# Patient Record
Sex: MALE | Race: BLACK OR AFRICAN AMERICAN | Employment: UNEMPLOYED | URBAN - METROPOLITAN AREA
[De-identification: names, ages, dates, MRNs, and addresses within clinical notes are randomized per-mention and may not be internally consistent; named-entity substitution may affect disease eponyms.]

---

## 2023-05-01 ENCOUNTER — HOSPITAL ENCOUNTER (EMERGENCY)
Age: 48
Discharge: HOME | End: 2023-05-01
Attending: EMERGENCY MEDICINE
Payer: COMMERCIAL

## 2023-05-01 VITALS
DIASTOLIC BLOOD PRESSURE: 90 MMHG | TEMPERATURE: 98.4 F | HEART RATE: 60 BPM | WEIGHT: 220.9 LBS | RESPIRATION RATE: 15 BRPM | OXYGEN SATURATION: 97 % | SYSTOLIC BLOOD PRESSURE: 147 MMHG

## 2023-05-01 DIAGNOSIS — R82.90 URINE ABNORMALITY: Primary | ICD-10-CM

## 2023-05-01 LAB
BILIRUB UR QL STRIP.AUTO: NEGATIVE
CLARITY UR: CLEAR
COLOR UR: YELLOW
GLUCOSE UR STRIP.AUTO-MCNC: NEGATIVE MG/DL
HGB UR QL STRIP.AUTO: NEGATIVE
HYALINE CASTS #/AREA URNS LPF: NORMAL /LPF (ref 0–2)
KETONES UR STRIP.AUTO-MCNC: NEGATIVE MG/DL
LEUKOCYTE ESTERASE UR QL STRIP.AUTO: NEGATIVE
NITRITE UR QL STRIP.AUTO: NEGATIVE
PH UR STRIP.AUTO: 6 [PH] (ref 5–8)
PROT UR STRIP.AUTO-MCNC: NEGATIVE MG/DL
RBC #/AREA URNS HPF: NORMAL /HPF (ref 0–4)
SP GR UR STRIP.AUTO: >=1.03 (ref 1–1.03)
UA DIPSTICK W REFLEX MICRO PNL UR: NORMAL
URN SPEC COLLECT METH UR: NORMAL
UROBILINOGEN UR STRIP-ACNC: 0.2 E.U./DL
WBC #/AREA URNS HPF: NORMAL /HPF (ref 0–5)

## 2023-05-01 PROCEDURE — 81001 URINALYSIS AUTO W/SCOPE: CPT

## 2023-05-01 PROCEDURE — 99283 EMERGENCY DEPT VISIT LOW MDM: CPT

## 2023-05-01 ASSESSMENT — PAIN - FUNCTIONAL ASSESSMENT
PAIN_FUNCTIONAL_ASSESSMENT: NONE - DENIES PAIN
PAIN_FUNCTIONAL_ASSESSMENT: NONE - DENIES PAIN

## 2023-05-01 NOTE — ED TRIAGE NOTES
Chief Complaint   Patient presents with    Other     Pt states, \"I'm 46 and I want my prostate and urinary tract checked due to my age\".

## 2023-05-01 NOTE — DISCHARGE INSTRUCTIONS
Urine today looked good. There were no signs of infection. You should follow up with a primary care doctor to get a general checkup so that you can continue to stay healthy.

## 2023-05-01 NOTE — ED PROVIDER NOTES
1 [facility]  EMERGENCY DEPARTMENT ENCOUNTER          ATTENDING PHYSICIAN NOTE       Date of evaluation: 5/1/2023      Assessment/ Medical Decion Making     MDM: Violette Kaye is a 46 y.o. male no significant past medical history who presents for evaluation of his urine. He was obtained today which was without evidence of infection. Offered STI testing as well but patient politely declined. He has no symptoms of prostatitis, intra-abdominal infection, urolithiasis, orchitis, epididymitis, torsion, or other dangerous acute pathology. He denied any additional needs or concerns and stated he is hungry and ready to go. Without any symptoms or other acute medical concerns I do think he is appropriate for discharge. He was encouraged to establish care with a primary care provider. He given a referral to the internal medicine clinic. Asmita Bridges MD  3:52 PM    Clinical Impression     1. Urine abnormality        Disposition     DISPOSITION Decision To Discharge 05/01/2023 03:41:45 PM        Chief Complaint     Other (Pt states, \"I'm 46 and I want my prostate and urinary tract checked due to my age\". )      History of Present Illness     Violette Kaye is a 46 y.o. male with no significant past medical history who presents for evaluation of urinary concerns. That he turned 46 and one of his prostate and urinary tract infection checked because of his age and because men get urinary tract infections. He denies any symptoms including any dysuria, urinary frequency, difficulty urinating. Denies any difficulty initiating stream.  Denies any dribbling or abnormal flow of his urinary stream.  He denies any penile discharge, testicular pain, scrotal swelling or concern for STI. He denies any abdominal pain. Denies pain with bowel movements or rectal pain. Denies any other complaints. Review of Systems     Pertinent positive and negative findings as documented in the HPI.  Otherwise a complete